# Patient Record
Sex: MALE | Race: WHITE
[De-identification: names, ages, dates, MRNs, and addresses within clinical notes are randomized per-mention and may not be internally consistent; named-entity substitution may affect disease eponyms.]

---

## 2023-04-20 ENCOUNTER — HOSPITAL ENCOUNTER (EMERGENCY)
Dept: HOSPITAL 26 - MED | Age: 23
LOS: 1 days | Discharge: HOME | End: 2023-04-21
Payer: COMMERCIAL

## 2023-04-20 VITALS — BODY MASS INDEX: 28.63 KG/M2 | HEIGHT: 70 IN | WEIGHT: 200 LBS

## 2023-04-20 VITALS — SYSTOLIC BLOOD PRESSURE: 134 MMHG | DIASTOLIC BLOOD PRESSURE: 78 MMHG

## 2023-04-20 DIAGNOSIS — S61.411A: Primary | ICD-10-CM
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
## 2023-04-21 VITALS — SYSTOLIC BLOOD PRESSURE: 128 MMHG | DIASTOLIC BLOOD PRESSURE: 78 MMHG

## 2023-04-21 NOTE — NUR
Patient discharged with v/s stable. Written and verbal after care instructions 
given and explained. 

Patient alert, oriented and verbalized understanding of instructions. 
Ambulatory with steady gait. All questions addressed prior to discharge. ID 
band removed. Patient advised to follow up with PMD. Rx of Mupirocin given. 
Patient educated on indication of medication including possible reaction and 
side effects. Opportunity to ask questions provided and answered.

## 2023-04-21 NOTE — NUR
Patient BIB by family from home. C/O right hand laceration x today. Patient 
reported, accidently cut himself with knife while was washing dishes tonight. 
Patient had laceration wound right hand (palm) ~ 4 cm. Clean his laceration 
wound with NSS.

## 2023-04-21 NOTE — NUR
Patient has a 4 cm laceration to right hand.  Dr. De La Garza applied sutures using 
sterile technique.  Edges well approximated.  Site cleansed with NSS.  No 
bleeding noted.  Pt tolerated well.